# Patient Record
Sex: FEMALE | Race: WHITE | Employment: FULL TIME | URBAN - METROPOLITAN AREA
[De-identification: names, ages, dates, MRNs, and addresses within clinical notes are randomized per-mention and may not be internally consistent; named-entity substitution may affect disease eponyms.]

---

## 2019-06-18 ENCOUNTER — OFFICE VISIT (OUTPATIENT)
Dept: FAMILY MEDICINE | Facility: CLINIC | Age: 29
End: 2019-06-18
Payer: COMMERCIAL

## 2019-06-18 VITALS
WEIGHT: 150 LBS | RESPIRATION RATE: 14 BRPM | TEMPERATURE: 98.4 F | HEART RATE: 66 BPM | DIASTOLIC BLOOD PRESSURE: 70 MMHG | SYSTOLIC BLOOD PRESSURE: 110 MMHG | OXYGEN SATURATION: 99 %

## 2019-06-18 DIAGNOSIS — Z23 NEED FOR HEPATITIS A VACCINATION: ICD-10-CM

## 2019-06-18 DIAGNOSIS — Z71.84 ENCOUNTER FOR COUNSELING FOR TRAVEL: Primary | ICD-10-CM

## 2019-06-18 DIAGNOSIS — Z23 NEED FOR IMMUNIZATION AGAINST TYPHOID: ICD-10-CM

## 2019-06-18 PROCEDURE — 90472 IMMUNIZATION ADMIN EACH ADD: CPT | Performed by: PHYSICIAN ASSISTANT

## 2019-06-18 PROCEDURE — 99401 PREV MED CNSL INDIV APPRX 15: CPT | Mod: 25 | Performed by: PHYSICIAN ASSISTANT

## 2019-06-18 PROCEDURE — 90471 IMMUNIZATION ADMIN: CPT | Performed by: PHYSICIAN ASSISTANT

## 2019-06-18 PROCEDURE — 90632 HEPA VACCINE ADULT IM: CPT | Performed by: PHYSICIAN ASSISTANT

## 2019-06-18 PROCEDURE — 90691 TYPHOID VACCINE IM: CPT | Performed by: PHYSICIAN ASSISTANT

## 2019-06-18 RX ORDER — ISOTRETINOIN 40 MG/1
40 CAPSULE ORAL
Status: ON HOLD | COMMUNITY
Start: 2019-06-11 | End: 2021-04-17

## 2019-06-18 RX ORDER — ATOVAQUONE AND PROGUANIL HYDROCHLORIDE 250; 100 MG/1; MG/1
1 TABLET, FILM COATED ORAL DAILY
Qty: 26 TABLET | Refills: 0 | Status: ON HOLD | OUTPATIENT
Start: 2019-06-18 | End: 2021-04-17

## 2019-06-18 RX ORDER — ETONOGESTREL AND ETHINYL ESTRADIOL VAGINAL RING .015; .12 MG/D; MG/D
1 RING VAGINAL
Status: ON HOLD | COMMUNITY
Start: 2019-01-25 | End: 2021-04-17

## 2019-06-18 RX ORDER — AZITHROMYCIN 500 MG/1
500 TABLET, FILM COATED ORAL DAILY
Qty: 3 TABLET | Refills: 0 | Status: SHIPPED | OUTPATIENT
Start: 2019-06-18 | End: 2019-06-21

## 2019-06-18 NOTE — PROGRESS NOTES
SUBJECTIVE: Tiffanie Leyva , a 29 year old  female, presents for counseling and information regarding upcoming travel to [facility]. Special medical concerns include: none. She anticipates the following unusual exposures: none.    Itinerary:  pleasure    Departure Date: 6/30/2019 Return date: 7/16/2019    Reason for travel (i.e. Business, pleasure): pleaseure    Visiting an urban or rural area?: urban    Accommodations (i.e. hotel, hostel, friends, family, etc): hotel and family    Women - First day of your last period: 6/5/2019    IMMUNIZATION HISTORY  Have you received any vaccinations in the past 4 weeks?  No  Have you ever fainted from having your blood drawn or from an injection?  Yes  Have you ever had a fever reaction to vaccination?  No  Have you ever had any bad reaction or side effect from any vaccination?  No  Have you ever had hepatitis A or B vaccine?  Yes  Do you live (or work closely) with anyone who has AIDS, an AIDS-like condition, any other immune disorder or who is on chemotherapy for cancer?  yes  Have you received any injection of immune globulin or any blood products during the past 12 months?  No    GENERAL MEDICAL HISTORY  Do you have a medical condition that warrants maintenance medication or physician follow-up?  yes  Do you have a medical condition that is stable now, but that may recur while traveling?  No  Has your spleen been removed?  No  Have you had an acute illness or a fever in the past 48 hours?  No  Are you pregnant, or might you become pregnant on this trip?  Any chance of pregnancy?  No  Are you breastfeeding?  No  Do you have HIV, AIDS, an AIDS-like condition, any other immune disorder, leukemia or cancer?  No  Do you have a severe combined immunodeficiency disease?  No  Have you had your thymus gland removed or history of problems with your thymus, such as myasthenia gravis, DiGeorge syndrome, or thymoma?  No    Do you have severe thrombocytopenia (low platelet count) or a  coagulation disorder?  No  Have you ever had a convulsion, seizure, epilepsy, neurologic condition or brain infection?  No  Do you have any stomach conditions?  No  Do you have a G6PD deficiency?  No  Do you have severe renal or kidney impairment?  No  Do you have a history of psychiatric problems?  yes  Do you have a problem with strange dreams and/or nightmares?  No  Do you have insomnia?  No  Do you have problems with vaginitis?  No  Do you have psoriasis?  No  Are you prone to motion sickness?  yes  Have you ever had headaches, nausea, vomiting, or breathing problems from altitude exposure?  No      No past medical history on file.     There is no immunization history on file for this patient.    No current outpatient medications on file.     Allergies not on file     EXAM: deferred    Immunizations discussed include: Hepatitis A and Typhoid  Malaria prophylaxis recommended: Malarone  Symptomatic treatment for traveler's diarrhea: bismuth subsalicylate, loperamide/diphenoxylate and azithromycin    ASSESSMENT/PLAN:    (Z71.89) Encounter for counseling for travel  (primary encounter diagnosis)    Comment: Hepatitis A and typhoid vaccines today. Patient will return or follow-up with PCP in 6 months for Hep A booster. Prophylaxis given for Traveler's diarrhea and Malaria. All questions were answered.     Plan: atovaquone-proguanil (MALARONE) 250-100 MG         tablet, azithromycin (ZITHROMAX) 500 MG tablet            (Z23) Need for hepatitis A vaccination  Comment:   Plan: HEPA VACCINE ADULT IM            (Z23) Need for immunization against typhoid  Comment:   Plan: TYPHOID VACCINE, IM              I have reviewed general recommendations for safe travel   including: food/water precautions, insect avoidance, safe sex   practices given high prevalence of HIV and other STDs,   roadway safety. Educational materials and links to the CDC   Traveler's health website have been provided.    Total time 15 minutes, greater  than 50 percent in counseling   and coordination of care.

## 2019-06-18 NOTE — PATIENT INSTRUCTIONS
See travel packet provided  Recommend ultrathon, pepto bismol and imodium  Also bring hand  and sun screen with you.  Safe Travels       Today June 18, 2019 you received the    Hepatitis A Vaccine - Please return on 12/18/19 or later for your 2nd and final dose.    Typhoid - injectable. This vaccine is valid for two years.   .    These appointments can be made as a NURSE ONLY visit.    **It is very important for the vaccinations to be given on the scheduled day(s), this helps ensure you receive the full effectiveness of the vaccine.**    Please call [facility] with any questions [phone]    Thank you for visiting [facility]

## 2020-09-01 LAB
HBV SURFACE AG SERPL QL IA: NEGATIVE
HIV 1+2 AB+HIV1 P24 AG SERPL QL IA: NEGATIVE
RUBELLA ABY IGG: NORMAL

## 2021-03-16 LAB — GROUP B STREP PCR: NEGATIVE

## 2021-04-07 DIAGNOSIS — Z33.1 PREGNANCY, INCIDENTAL: Primary | ICD-10-CM

## 2021-04-15 ENCOUNTER — ANESTHESIA (OUTPATIENT)
Dept: OBGYN | Facility: CLINIC | Age: 31
End: 2021-04-15
Payer: COMMERCIAL

## 2021-04-15 ENCOUNTER — ANESTHESIA EVENT (OUTPATIENT)
Dept: OBGYN | Facility: CLINIC | Age: 31
End: 2021-04-15
Payer: COMMERCIAL

## 2021-04-15 ENCOUNTER — HOSPITAL ENCOUNTER (INPATIENT)
Facility: CLINIC | Age: 31
LOS: 3 days | Discharge: HOME | End: 2021-04-18
Attending: OBSTETRICS & GYNECOLOGY | Admitting: OBSTETRICS & GYNECOLOGY
Payer: COMMERCIAL

## 2021-04-15 DIAGNOSIS — O14.00 MILD PRE-ECLAMPSIA, ANTEPARTUM: ICD-10-CM

## 2021-04-15 DIAGNOSIS — Z33.1 PREGNANCY, INCIDENTAL: ICD-10-CM

## 2021-04-15 PROBLEM — Z36.89 ENCOUNTER FOR TRIAGE IN PREGNANT PATIENT: Status: ACTIVE | Noted: 2021-04-15

## 2021-04-15 LAB
ABO + RH BLD: NORMAL
ABO + RH BLD: NORMAL
ALBUMIN SERPL-MCNC: 2.6 G/DL (ref 3.4–5)
ALP SERPL-CCNC: 136 U/L (ref 40–150)
ALT SERPL W P-5'-P-CCNC: 17 U/L (ref 0–50)
ANION GAP SERPL CALCULATED.3IONS-SCNC: 7 MMOL/L (ref 3–14)
AST SERPL W P-5'-P-CCNC: 13 U/L (ref 0–45)
BILIRUB SERPL-MCNC: 0.3 MG/DL (ref 0.2–1.3)
BUN SERPL-MCNC: 8 MG/DL (ref 7–30)
CALCIUM SERPL-MCNC: 8.6 MG/DL (ref 8.5–10.1)
CHLORIDE SERPL-SCNC: 109 MMOL/L (ref 94–109)
CO2 SERPL-SCNC: 21 MMOL/L (ref 20–32)
CREAT SERPL-MCNC: 0.63 MG/DL (ref 0.52–1.04)
CREAT UR-MCNC: 27 MG/DL
ERYTHROCYTE [DISTWIDTH] IN BLOOD BY AUTOMATED COUNT: 12.7 % (ref 10–15)
GFR SERPL CREATININE-BSD FRML MDRD: >90 ML/MIN/{1.73_M2}
GLUCOSE SERPL-MCNC: 72 MG/DL (ref 70–99)
HCT VFR BLD AUTO: 39.6 % (ref 35–47)
HGB BLD-MCNC: 12.8 G/DL (ref 11.7–15.7)
LABORATORY COMMENT REPORT: NORMAL
MCH RBC QN AUTO: 29.8 PG (ref 26.5–33)
MCHC RBC AUTO-ENTMCNC: 32.3 G/DL (ref 31.5–36.5)
MCV RBC AUTO: 92 FL (ref 78–100)
PLATELET # BLD AUTO: 248 10E9/L (ref 150–450)
POTASSIUM SERPL-SCNC: 3.9 MMOL/L (ref 3.4–5.3)
PROT SERPL-MCNC: 6.3 G/DL (ref 6.8–8.8)
PROT UR-MCNC: 0.1 G/L
PROT/CREAT 24H UR: 0.38 G/G CR (ref 0–0.2)
RBC # BLD AUTO: 4.29 10E12/L (ref 3.8–5.2)
SARS-COV-2 RNA RESP QL NAA+PROBE: NEGATIVE
SARS-COV-2 RNA RESP QL NAA+PROBE: NORMAL
SODIUM SERPL-SCNC: 137 MMOL/L (ref 133–144)
SPECIMEN EXP DATE BLD: NORMAL
SPECIMEN SOURCE: NORMAL
SPECIMEN SOURCE: NORMAL
WBC # BLD AUTO: 9.3 10E9/L (ref 4–11)

## 2021-04-15 PROCEDURE — 84156 ASSAY OF PROTEIN URINE: CPT | Performed by: OBSTETRICS & GYNECOLOGY

## 2021-04-15 PROCEDURE — 120N000001 HC R&B MED SURG/OB

## 2021-04-15 PROCEDURE — U0003 INFECTIOUS AGENT DETECTION BY NUCLEIC ACID (DNA OR RNA); SEVERE ACUTE RESPIRATORY SYNDROME CORONAVIRUS 2 (SARS-COV-2) (CORONAVIRUS DISEASE [COVID-19]), AMPLIFIED PROBE TECHNIQUE, MAKING USE OF HIGH THROUGHPUT TECHNOLOGIES AS DESCRIBED BY CMS-2020-01-R: HCPCS | Performed by: OBSTETRICS & GYNECOLOGY

## 2021-04-15 PROCEDURE — 86900 BLOOD TYPING SEROLOGIC ABO: CPT | Performed by: OBSTETRICS & GYNECOLOGY

## 2021-04-15 PROCEDURE — 80053 COMPREHEN METABOLIC PANEL: CPT | Performed by: OBSTETRICS & GYNECOLOGY

## 2021-04-15 PROCEDURE — 370N000003 HC ANESTHESIA WARD SERVICE

## 2021-04-15 PROCEDURE — U0005 INFEC AGEN DETEC AMPLI PROBE: HCPCS | Performed by: OBSTETRICS & GYNECOLOGY

## 2021-04-15 PROCEDURE — 86780 TREPONEMA PALLIDUM: CPT | Performed by: OBSTETRICS & GYNECOLOGY

## 2021-04-15 PROCEDURE — 85027 COMPLETE CBC AUTOMATED: CPT | Performed by: OBSTETRICS & GYNECOLOGY

## 2021-04-15 PROCEDURE — 999N000011 HC STATISTIC ANESTHESIA CASE

## 2021-04-15 PROCEDURE — 36415 COLL VENOUS BLD VENIPUNCTURE: CPT | Performed by: OBSTETRICS & GYNECOLOGY

## 2021-04-15 PROCEDURE — 86901 BLOOD TYPING SEROLOGIC RH(D): CPT | Performed by: OBSTETRICS & GYNECOLOGY

## 2021-04-15 RX ORDER — NALOXONE HYDROCHLORIDE 0.4 MG/ML
0.4 INJECTION, SOLUTION INTRAMUSCULAR; INTRAVENOUS; SUBCUTANEOUS
Status: DISCONTINUED | OUTPATIENT
Start: 2021-04-15 | End: 2021-04-16

## 2021-04-15 RX ORDER — ONDANSETRON 2 MG/ML
4 INJECTION INTRAMUSCULAR; INTRAVENOUS EVERY 6 HOURS PRN
Status: DISCONTINUED | OUTPATIENT
Start: 2021-04-15 | End: 2021-04-16

## 2021-04-15 RX ORDER — NALBUPHINE HYDROCHLORIDE 10 MG/ML
2.5-5 INJECTION, SOLUTION INTRAMUSCULAR; INTRAVENOUS; SUBCUTANEOUS EVERY 6 HOURS PRN
Status: DISCONTINUED | OUTPATIENT
Start: 2021-04-15 | End: 2021-04-16

## 2021-04-15 RX ORDER — MAGNESIUM SULFATE HEPTAHYDRATE 40 MG/ML
2 INJECTION, SOLUTION INTRAVENOUS
Status: DISCONTINUED | OUTPATIENT
Start: 2021-04-15 | End: 2021-04-16

## 2021-04-15 RX ORDER — DIPHENHYDRAMINE HYDROCHLORIDE 50 MG/ML
25 INJECTION INTRAMUSCULAR; INTRAVENOUS EVERY 6 HOURS PRN
Status: DISCONTINUED | OUTPATIENT
Start: 2021-04-15 | End: 2021-04-18 | Stop reason: HOSPADM

## 2021-04-15 RX ORDER — NALOXONE HYDROCHLORIDE 0.4 MG/ML
0.2 INJECTION, SOLUTION INTRAMUSCULAR; INTRAVENOUS; SUBCUTANEOUS
Status: DISCONTINUED | OUTPATIENT
Start: 2021-04-15 | End: 2021-04-16

## 2021-04-15 RX ORDER — ACETAMINOPHEN 325 MG/1
650 TABLET ORAL EVERY 4 HOURS PRN
Status: DISCONTINUED | OUTPATIENT
Start: 2021-04-15 | End: 2021-04-16

## 2021-04-15 RX ORDER — ESCITALOPRAM OXALATE 20 MG/1
20 TABLET ORAL DAILY
COMMUNITY
Start: 2021-04-08

## 2021-04-15 RX ORDER — LABETALOL HYDROCHLORIDE 5 MG/ML
80 INJECTION, SOLUTION INTRAVENOUS EVERY 10 MIN PRN
Status: DISCONTINUED | OUTPATIENT
Start: 2021-04-15 | End: 2021-04-16

## 2021-04-15 RX ORDER — MAGNESIUM SULFATE HEPTAHYDRATE 500 MG/ML
4 INJECTION, SOLUTION INTRAMUSCULAR; INTRAVENOUS
Status: DISCONTINUED | OUTPATIENT
Start: 2021-04-15 | End: 2021-04-16

## 2021-04-15 RX ORDER — MAGNESIUM SULFATE HEPTAHYDRATE 40 MG/ML
4 INJECTION, SOLUTION INTRAVENOUS
Status: DISCONTINUED | OUTPATIENT
Start: 2021-04-15 | End: 2021-04-16

## 2021-04-15 RX ORDER — NIFEDIPINE 10 MG/1
10 CAPSULE ORAL
Status: DISCONTINUED | OUTPATIENT
Start: 2021-04-15 | End: 2021-04-16

## 2021-04-15 RX ORDER — LABETALOL HYDROCHLORIDE 5 MG/ML
40 INJECTION, SOLUTION INTRAVENOUS EVERY 10 MIN PRN
Status: DISCONTINUED | OUTPATIENT
Start: 2021-04-15 | End: 2021-04-16

## 2021-04-15 RX ORDER — OXYTOCIN 10 [USP'U]/ML
10 INJECTION, SOLUTION INTRAMUSCULAR; INTRAVENOUS
Status: DISCONTINUED | OUTPATIENT
Start: 2021-04-15 | End: 2021-04-16

## 2021-04-15 RX ORDER — ESCITALOPRAM OXALATE 20 MG/1
20 TABLET ORAL DAILY
Status: DISCONTINUED | OUTPATIENT
Start: 2021-04-16 | End: 2021-04-18 | Stop reason: HOSPADM

## 2021-04-15 RX ORDER — LABETALOL HYDROCHLORIDE 5 MG/ML
20 INJECTION, SOLUTION INTRAVENOUS EVERY 10 MIN PRN
Status: DISCONTINUED | OUTPATIENT
Start: 2021-04-15 | End: 2021-04-16

## 2021-04-15 RX ORDER — OXYTOCIN/0.9 % SODIUM CHLORIDE 30/500 ML
100-340 PLASTIC BAG, INJECTION (ML) INTRAVENOUS CONTINUOUS PRN
Status: DISCONTINUED | OUTPATIENT
Start: 2021-04-15 | End: 2021-04-16

## 2021-04-15 RX ORDER — ONDANSETRON 4 MG/1
4 TABLET, ORALLY DISINTEGRATING ORAL EVERY 6 HOURS PRN
Status: DISCONTINUED | OUTPATIENT
Start: 2021-04-15 | End: 2021-04-16

## 2021-04-15 RX ORDER — FENTANYL CITRATE-0.9 % NACL/PF 10 MCG/ML
100 PLASTIC BAG, INJECTION (ML) INTRAVENOUS EVERY 5 MIN PRN
Status: DISCONTINUED | OUTPATIENT
Start: 2021-04-15 | End: 2021-04-16

## 2021-04-15 RX ORDER — SODIUM CHLORIDE, SODIUM LACTATE, POTASSIUM CHLORIDE, CALCIUM CHLORIDE 600; 310; 30; 20 MG/100ML; MG/100ML; MG/100ML; MG/100ML
10-125 INJECTION, SOLUTION INTRAVENOUS CONTINUOUS
Status: DISCONTINUED | OUTPATIENT
Start: 2021-04-15 | End: 2021-04-16

## 2021-04-15 RX ORDER — METHYLERGONOVINE MALEATE 0.2 MG/ML
200 INJECTION INTRAVENOUS
Status: DISCONTINUED | OUTPATIENT
Start: 2021-04-15 | End: 2021-04-16

## 2021-04-15 RX ORDER — TRANEXAMIC ACID 10 MG/ML
1 INJECTION, SOLUTION INTRAVENOUS EVERY 30 MIN PRN
Status: DISCONTINUED | OUTPATIENT
Start: 2021-04-15 | End: 2021-04-16

## 2021-04-15 RX ORDER — DIPHENHYDRAMINE HCL 25 MG
25 CAPSULE ORAL EVERY 6 HOURS PRN
Status: DISCONTINUED | OUTPATIENT
Start: 2021-04-15 | End: 2021-04-18 | Stop reason: HOSPADM

## 2021-04-15 RX ORDER — CARBOPROST TROMETHAMINE 250 UG/ML
250 INJECTION, SOLUTION INTRAMUSCULAR
Status: DISCONTINUED | OUTPATIENT
Start: 2021-04-15 | End: 2021-04-16

## 2021-04-15 RX ORDER — HYDROXYZINE HYDROCHLORIDE 50 MG/1
50 TABLET, FILM COATED ORAL EVERY 6 HOURS
COMMUNITY
Start: 2020-09-29

## 2021-04-15 RX ORDER — SODIUM CHLORIDE, SODIUM LACTATE, POTASSIUM CHLORIDE, CALCIUM CHLORIDE 600; 310; 30; 20 MG/100ML; MG/100ML; MG/100ML; MG/100ML
INJECTION, SOLUTION INTRAVENOUS CONTINUOUS
Status: DISCONTINUED | OUTPATIENT
Start: 2021-04-15 | End: 2021-04-16

## 2021-04-15 RX ORDER — IBUPROFEN 800 MG/1
800 TABLET, FILM COATED ORAL
Status: DISCONTINUED | OUTPATIENT
Start: 2021-04-15 | End: 2021-04-16

## 2021-04-15 RX ORDER — LORAZEPAM 2 MG/ML
2 INJECTION INTRAMUSCULAR
Status: DISCONTINUED | OUTPATIENT
Start: 2021-04-15 | End: 2021-04-16

## 2021-04-15 RX ORDER — LIDOCAINE HYDROCHLORIDE AND EPINEPHRINE 15; 5 MG/ML; UG/ML
3 INJECTION, SOLUTION EPIDURAL
Status: DISCONTINUED | OUTPATIENT
Start: 2021-04-15 | End: 2021-04-16

## 2021-04-15 RX ORDER — OXYCODONE AND ACETAMINOPHEN 5; 325 MG/1; MG/1
1 TABLET ORAL
Status: DISCONTINUED | OUTPATIENT
Start: 2021-04-15 | End: 2021-04-16

## 2021-04-15 RX ORDER — HYDROXYZINE HYDROCHLORIDE 50 MG/ML
25 INJECTION, SOLUTION INTRAMUSCULAR EVERY 6 HOURS PRN
Status: DISCONTINUED | OUTPATIENT
Start: 2021-04-15 | End: 2021-04-18 | Stop reason: HOSPADM

## 2021-04-15 ASSESSMENT — MIFFLIN-ST. JEOR: SCORE: 1687.5

## 2021-04-15 NOTE — PROVIDER NOTIFICATION
04/15/21 1730   Provider Notification   Provider Name/Title Dr Park   Method of Notification At Bedside   Request Evaluate in Person   Notification Reason Status Update;SVE

## 2021-04-15 NOTE — H&P
Labor & Delivery History & Physical  Patient Name:  Tiffanie Leyva   MRN:  [id number]  Age:  30 year old    YOB: 1990      Subjective:    Tiffanie Leyva is a 30 year old  at 40+6 wga, who presents from clinic for pre-E evaluation.  She had mild range Bps in clinic.  She denies any headaches, vision change, CP, SOB, n/v, upper abdominal pain, or increased swelling.    Patient denies leaking of fluid or vaginal bleeding.  Fetal Movement: present.       Prenatal care complicated by:  - Anx, Dep: on Lexapro 20mg and Atarax PRN.  Followed by psych and therapy  - A pos  - GBS negative       Pregnancy Episode Problems (from 22 to present)     No problems associated with this episode.        OB History    Para Term  AB Living   1 0 0 0 0 0   SAB TAB Ectopic Multiple Live Births   0 0 0 0 0      # Outcome Date GA Lbr Dhaval/2nd Weight Sex Delivery Anes PTL Lv   1 Current              No past medical history on file.  No past surgical history on file.  Social History     Tobacco Use     Smoking status: Never Smoker     Smokeless tobacco: Never Used   Substance Use Topics     Alcohol use: Yes     Drug use: Never      History   Drug Use Unknown     No family history on file.  [unfilled]   Medications Prior to Admission   Medication Sig Dispense Refill Last Dose     atovaquone-proguanil (MALARONE) 250-100 MG tablet Take 1 tablet by mouth daily Start 2 days before travel and continue 7 days after return. 26 tablet 0      etonogestrel-ethinyl estradiol (NUVARING) 0.12-0.015 MG/24HR vaginal ring Place 1 each vaginally every 28 days        ISOtretinoin (ACCUTANE) 40 MG capsule Take 40 mg by mouth        Most Recent Immunizations   Administered Date(s) Administered     Hep B, Peds or Adolescent 2004     HepA-Adult 2019     HepB, Unspecified 2003     Hib (PRP-T) 1992     Historical DTP/aP 1996     Influenza Vaccine IM > 6 months Valent IIV4 2019      Influenza Vaccine IM Ages 6-35 Months 4 Valent (PF) 2005     MMR 2003     OPV, unspecified 1996     TDAP Vaccine (Adacel) 06/10/2015     Td (Adult), Adsorbed 2003     Typhoid IM 2019       Review of Systems - NEGATIVE FOR  Fevers  Chills  Headache  Visual changes  Ear pain  Sore throat  Cough  Shortness of breath  Chest pain  Palpitations  Constipation  Diarrhea  Vomiting  Bloody stools  Hematuria  Dysuria  Muscle weakness  Gait disturbance    Objective:  BP: ()/()   Arterial Line BP: ()/()   0 lbs 0 oz      General: General appearance: NAD.   Lungs:   unlabored   Heart:     Abdomen: Gravid, nontender   Branch: Contraction Frequency (min): q 3-5 min  Contraction Duration (sec):     FHT: Baseline 120 BPM   Fetal heart variability:moderate  Fetal heart rate accelerations:  Present 15 x 15  Fetal heart rate decelerations: none  Fetal heart rate interpretation:  Category I   Speculum:     Cervix: Dilation:   1.5  Effacement:     50  Station:             -1    Extremities: Trace to 1+ edema bilateral, symmetric edema; neg Clyde's sign       Lab Review:    No results found for: HGB, HCT, RPR, HEPBSAG, TSH        Assessment  Tiffanie Leyva is a 30 year old  at 40+6 wga with preE without SF    Plan  - Pre-E without SF:  Diagnosis based on mild range Bps and elevated prot:cr ratio (0.38).     - Will admit and start cervical ripening.     - Monitor Bps closely and treat with IV antihypertensives + mag if sustained severe range Bps  - Cervical ripening: We discussed her options for cytotec, cervidil, and balloon.  We reviewed risks of cytotec and cervidil include tachysystole (given that she is already roxanne regularly) with possible subsequent fetal distress.  She will think about her options.  - FHT: cat I. Cont CEFM.  - Anx, Dep: on Lexapro 20mg and Atarax PRN.  Mood stable with no SI/HI. Followed by psych and therapy .  - A pos  - GBS negative  - PP Contraception: NuvaRing  -  Anticipate

## 2021-04-15 NOTE — PROVIDER NOTIFICATION
04/15/21 1840   Provider Notification   Provider Name/Title Dr Park   Method of Notification Electronic Page;Phone   Notification Reason Other (Comment)   pt preference for cerv ripening - balloon

## 2021-04-16 LAB — T PALLIDUM AB SER QL: NONREACTIVE

## 2021-04-16 PROCEDURE — 250N000013 HC RX MED GY IP 250 OP 250 PS 637: Performed by: OBSTETRICS & GYNECOLOGY

## 2021-04-16 PROCEDURE — 722N000001 HC LABOR CARE VAGINAL DELIVERY SINGLE

## 2021-04-16 PROCEDURE — 3E0R3BZ INTRODUCTION OF ANESTHETIC AGENT INTO SPINAL CANAL, PERCUTANEOUS APPROACH: ICD-10-PCS | Performed by: ANESTHESIOLOGY

## 2021-04-16 PROCEDURE — 88307 TISSUE EXAM BY PATHOLOGIST: CPT | Mod: 26 | Performed by: PATHOLOGY

## 2021-04-16 PROCEDURE — 250N000011 HC RX IP 250 OP 636: Performed by: ANESTHESIOLOGY

## 2021-04-16 PROCEDURE — 0KQM0ZZ REPAIR PERINEUM MUSCLE, OPEN APPROACH: ICD-10-PCS | Performed by: OBSTETRICS & GYNECOLOGY

## 2021-04-16 PROCEDURE — 88307 TISSUE EXAM BY PATHOLOGIST: CPT | Mod: TC | Performed by: OBSTETRICS & GYNECOLOGY

## 2021-04-16 PROCEDURE — 120N000001 HC R&B MED SURG/OB

## 2021-04-16 PROCEDURE — 250N000009 HC RX 250: Performed by: OBSTETRICS & GYNECOLOGY

## 2021-04-16 PROCEDURE — 258N000003 HC RX IP 258 OP 636: Performed by: OBSTETRICS & GYNECOLOGY

## 2021-04-16 PROCEDURE — 10907ZC DRAINAGE OF AMNIOTIC FLUID, THERAPEUTIC FROM PRODUCTS OF CONCEPTION, VIA NATURAL OR ARTIFICIAL OPENING: ICD-10-PCS | Performed by: OBSTETRICS & GYNECOLOGY

## 2021-04-16 PROCEDURE — 00HU33Z INSERTION OF INFUSION DEVICE INTO SPINAL CANAL, PERCUTANEOUS APPROACH: ICD-10-PCS | Performed by: ANESTHESIOLOGY

## 2021-04-16 RX ORDER — MAGNESIUM SULFATE HEPTAHYDRATE 40 MG/ML
2 INJECTION, SOLUTION INTRAVENOUS
Status: DISCONTINUED | OUTPATIENT
Start: 2021-04-16 | End: 2021-04-18 | Stop reason: HOSPADM

## 2021-04-16 RX ORDER — LORAZEPAM 2 MG/ML
2 INJECTION INTRAMUSCULAR
Status: DISCONTINUED | OUTPATIENT
Start: 2021-04-16 | End: 2021-04-18 | Stop reason: HOSPADM

## 2021-04-16 RX ORDER — MAGNESIUM SULFATE HEPTAHYDRATE 40 MG/ML
4 INJECTION, SOLUTION INTRAVENOUS
Status: DISCONTINUED | OUTPATIENT
Start: 2021-04-16 | End: 2021-04-18 | Stop reason: HOSPADM

## 2021-04-16 RX ORDER — IBUPROFEN 800 MG/1
800 TABLET, FILM COATED ORAL EVERY 6 HOURS PRN
Status: DISCONTINUED | OUTPATIENT
Start: 2021-04-16 | End: 2021-04-18 | Stop reason: HOSPADM

## 2021-04-16 RX ORDER — TRANEXAMIC ACID 10 MG/ML
1 INJECTION, SOLUTION INTRAVENOUS EVERY 30 MIN PRN
Status: DISCONTINUED | OUTPATIENT
Start: 2021-04-16 | End: 2021-04-18 | Stop reason: HOSPADM

## 2021-04-16 RX ORDER — NALBUPHINE HYDROCHLORIDE 10 MG/ML
2.5-5 INJECTION, SOLUTION INTRAMUSCULAR; INTRAVENOUS; SUBCUTANEOUS EVERY 6 HOURS PRN
Status: DISCONTINUED | OUTPATIENT
Start: 2021-04-16 | End: 2021-04-16

## 2021-04-16 RX ORDER — AMOXICILLIN 250 MG
2 CAPSULE ORAL 2 TIMES DAILY
Status: DISCONTINUED | OUTPATIENT
Start: 2021-04-16 | End: 2021-04-18 | Stop reason: HOSPADM

## 2021-04-16 RX ORDER — OXYTOCIN/0.9 % SODIUM CHLORIDE 30/500 ML
100 PLASTIC BAG, INJECTION (ML) INTRAVENOUS CONTINUOUS
Status: DISCONTINUED | OUTPATIENT
Start: 2021-04-16 | End: 2021-04-18 | Stop reason: HOSPADM

## 2021-04-16 RX ORDER — FENTANYL CITRATE-0.9 % NACL/PF 10 MCG/ML
100 PLASTIC BAG, INJECTION (ML) INTRAVENOUS EVERY 5 MIN PRN
Status: DISCONTINUED | OUTPATIENT
Start: 2021-04-16 | End: 2021-04-16

## 2021-04-16 RX ORDER — LABETALOL HYDROCHLORIDE 5 MG/ML
80 INJECTION, SOLUTION INTRAVENOUS EVERY 10 MIN PRN
Status: DISCONTINUED | OUTPATIENT
Start: 2021-04-16 | End: 2021-04-18 | Stop reason: HOSPADM

## 2021-04-16 RX ORDER — AMOXICILLIN 250 MG
1 CAPSULE ORAL 2 TIMES DAILY
Status: DISCONTINUED | OUTPATIENT
Start: 2021-04-16 | End: 2021-04-18 | Stop reason: HOSPADM

## 2021-04-16 RX ORDER — OXYTOCIN 10 [USP'U]/ML
10 INJECTION, SOLUTION INTRAMUSCULAR; INTRAVENOUS
Status: DISCONTINUED | OUTPATIENT
Start: 2021-04-16 | End: 2021-04-18 | Stop reason: HOSPADM

## 2021-04-16 RX ORDER — SCOLOPAMINE TRANSDERMAL SYSTEM 1 MG/1
1 PATCH, EXTENDED RELEASE TRANSDERMAL
Status: DISCONTINUED | OUTPATIENT
Start: 2021-04-16 | End: 2021-04-16

## 2021-04-16 RX ORDER — CALCIUM GLUCONATE 94 MG/ML
1 INJECTION, SOLUTION INTRAVENOUS
Status: DISCONTINUED | OUTPATIENT
Start: 2021-04-16 | End: 2021-04-18 | Stop reason: HOSPADM

## 2021-04-16 RX ORDER — LABETALOL HYDROCHLORIDE 5 MG/ML
20 INJECTION, SOLUTION INTRAVENOUS EVERY 10 MIN PRN
Status: DISCONTINUED | OUTPATIENT
Start: 2021-04-16 | End: 2021-04-18 | Stop reason: HOSPADM

## 2021-04-16 RX ORDER — LABETALOL HYDROCHLORIDE 5 MG/ML
40 INJECTION, SOLUTION INTRAVENOUS EVERY 10 MIN PRN
Status: DISCONTINUED | OUTPATIENT
Start: 2021-04-16 | End: 2021-04-18 | Stop reason: HOSPADM

## 2021-04-16 RX ORDER — ACETAMINOPHEN 325 MG/1
650 TABLET ORAL EVERY 4 HOURS PRN
Status: DISCONTINUED | OUTPATIENT
Start: 2021-04-16 | End: 2021-04-18 | Stop reason: HOSPADM

## 2021-04-16 RX ORDER — BISACODYL 10 MG
10 SUPPOSITORY, RECTAL RECTAL DAILY PRN
Status: DISCONTINUED | OUTPATIENT
Start: 2021-04-18 | End: 2021-04-18 | Stop reason: HOSPADM

## 2021-04-16 RX ORDER — MODIFIED LANOLIN
OINTMENT (GRAM) TOPICAL
Status: DISCONTINUED | OUTPATIENT
Start: 2021-04-16 | End: 2021-04-18 | Stop reason: HOSPADM

## 2021-04-16 RX ORDER — NIFEDIPINE 10 MG/1
10 CAPSULE ORAL
Status: DISCONTINUED | OUTPATIENT
Start: 2021-04-16 | End: 2021-04-18 | Stop reason: HOSPADM

## 2021-04-16 RX ORDER — OXYTOCIN/0.9 % SODIUM CHLORIDE 30/500 ML
340 PLASTIC BAG, INJECTION (ML) INTRAVENOUS CONTINUOUS PRN
Status: DISCONTINUED | OUTPATIENT
Start: 2021-04-16 | End: 2021-04-18 | Stop reason: HOSPADM

## 2021-04-16 RX ORDER — MAGNESIUM SULFATE HEPTAHYDRATE 500 MG/ML
4 INJECTION, SOLUTION INTRAMUSCULAR; INTRAVENOUS
Status: DISCONTINUED | OUTPATIENT
Start: 2021-04-16 | End: 2021-04-18 | Stop reason: HOSPADM

## 2021-04-16 RX ORDER — HYDROCORTISONE 2.5 %
CREAM (GRAM) TOPICAL 3 TIMES DAILY PRN
Status: DISCONTINUED | OUTPATIENT
Start: 2021-04-16 | End: 2021-04-18 | Stop reason: HOSPADM

## 2021-04-16 RX ORDER — OXYTOCIN/0.9 % SODIUM CHLORIDE 30/500 ML
1-24 PLASTIC BAG, INJECTION (ML) INTRAVENOUS CONTINUOUS
Status: DISCONTINUED | OUTPATIENT
Start: 2021-04-16 | End: 2021-04-16

## 2021-04-16 RX ORDER — FENTANYL CITRATE 50 UG/ML
50-100 INJECTION, SOLUTION INTRAMUSCULAR; INTRAVENOUS
Status: DISCONTINUED | OUTPATIENT
Start: 2021-04-16 | End: 2021-04-16

## 2021-04-16 RX ORDER — SODIUM CHLORIDE, SODIUM LACTATE, POTASSIUM CHLORIDE, CALCIUM CHLORIDE 600; 310; 30; 20 MG/100ML; MG/100ML; MG/100ML; MG/100ML
10-125 INJECTION, SOLUTION INTRAVENOUS CONTINUOUS
Status: DISCONTINUED | OUTPATIENT
Start: 2021-04-16 | End: 2021-04-18 | Stop reason: HOSPADM

## 2021-04-16 RX ADMIN — ESCITALOPRAM OXALATE 20 MG: 20 TABLET ORAL at 18:06

## 2021-04-16 RX ADMIN — Medication 3 MILLI-UNITS/MIN: at 16:01

## 2021-04-16 RX ADMIN — DOCUSATE SODIUM 50 MG AND SENNOSIDES 8.6 MG 1 TABLET: 8.6; 5 TABLET, FILM COATED ORAL at 22:01

## 2021-04-16 RX ADMIN — IBUPROFEN 800 MG: 800 TABLET ORAL at 17:30

## 2021-04-16 RX ADMIN — ACETAMINOPHEN 650 MG: 325 TABLET, FILM COATED ORAL at 06:16

## 2021-04-16 RX ADMIN — IBUPROFEN 800 MG: 800 TABLET ORAL at 23:17

## 2021-04-16 RX ADMIN — Medication 5 ML/HR: at 09:32

## 2021-04-16 RX ADMIN — SODIUM CHLORIDE, POTASSIUM CHLORIDE, SODIUM LACTATE AND CALCIUM CHLORIDE 500 ML: 600; 310; 30; 20 INJECTION, SOLUTION INTRAVENOUS at 08:51

## 2021-04-16 RX ADMIN — Medication 2 MILLI-UNITS/MIN: at 15:19

## 2021-04-16 RX ADMIN — SODIUM CHLORIDE, POTASSIUM CHLORIDE, SODIUM LACTATE AND CALCIUM CHLORIDE 500 ML: 600; 310; 30; 20 INJECTION, SOLUTION INTRAVENOUS at 08:50

## 2021-04-16 RX ADMIN — SODIUM CHLORIDE, POTASSIUM CHLORIDE, SODIUM LACTATE AND CALCIUM CHLORIDE: 600; 310; 30; 20 INJECTION, SOLUTION INTRAVENOUS at 13:27

## 2021-04-16 RX ADMIN — DIPHENHYDRAMINE HYDROCHLORIDE 25 MG: 25 CAPSULE ORAL at 02:11

## 2021-04-16 NOTE — PROVIDER NOTIFICATION
04/16/21 1430   Provider Notification   Provider Name/Title trixie   Method of Notification At Bedside   Notification Reason Other (Comment)  (Pushing)   SVE done by Dr Rodriguez, orders to begin pushing, Dr Rodirguez at bedside for pushing.

## 2021-04-16 NOTE — PROVIDER NOTIFICATION
04/15/21 2300   Provider Notification   Provider Name/Title Dr. Park   Method of Notification In Department   Request Evaluate - Remote   Notification Reason Status Update   Pt notified RN that she is having bleeding through the grewal tube, small amount of bleeding observed. Pt describes intermittent cramping, now feeling like menstrual cramping. Heating pad provided for back discomfort. MD states that if contractions space to every 7-10 minutes may start pitocin at 2 milliunits/min. Dose not to be increased until removal of the Grewal bulb.

## 2021-04-16 NOTE — PROVIDER NOTIFICATION
04/15/21 2031   Provider Notification   Provider Name/Title Dr. Park   Method of Notification In Department   Request Evaluate - Remote   Notification Reason Maternal Vital Sign Change     MD updated. Severe range BP x1, 161/94. Waiting for second check. Pt denies symptoms of pre-eclampsia, no headache or vision changes. Pt continues to contract regularly, denies painful contractions.  Recheck 152/92. Will continue to monitor. MD states q4 hour BP checks appropriate. Will assess grewal balloon at 0000 and before 0700. If contractions space notify MD, may order low dose Pitocin.

## 2021-04-16 NOTE — PLAN OF CARE
Vitally stable. Up to bathroom, unable to void at this time. Tolerating regular diet. Breast feeding infant. Denies pain. FOB at bedside and supportive. Transferred to room 431 with infant in arms in the wheelchair.

## 2021-04-16 NOTE — PROVIDER NOTIFICATION
04/16/21 0734   Provider Notification   Provider Name/Title Dr. Rodriguez   Method of Notification At Bedside   Request Evaluate in Person   Notification Reason Labor Status     MD at bedside, grewal removed (was in the vagina), SVE 9/100/0. Bulging bag of water left intact.

## 2021-04-16 NOTE — PROVIDER NOTIFICATION
04/16/21 1512   Provider Notification   Provider Name/Title Dr Rodriguez   Method of Notification In Department   Notification Reason Status Update   Updated re: SVE done and right lip of cervix still felt.When patient pushes, cervical lip goes away and pt pushing well with good fetal decsent.  states okay to continue with pushing. Also contractions spacing, q 3-4 minutes. Orders for pit augmentation. Pt agrees with plan.

## 2021-04-16 NOTE — PROGRESS NOTES
"[facility]   OB PROGRESS NOTE     S. Pt states she is doing well overall. Grewal balloon removed . +FM    /71   Temp 98  F (36.7  C) (Oral)   Resp 16   Ht 1.753 m (5' 9\")   Wt 89.8 kg (198 lb)   BMI 29.24 kg/m      Fetal Heart Tones: 145 baseline, moderate variablility, + accels, no decels and Category I  TOCO: frequency q 1-3 minutes  Cervical Exam: 9.5/ 100/ Anterior/ soft/ 0     A/P Tiffanie Leyva is a 30 year old  at 41w0d here with     1- pre-eclampsia without SF  - PCR 0.38   - grewal balloon removed and dilated to 9.5cm  - FHT Cat I  - continue monitoring and plan for vaginal delivery    Dr. Susy Rodriguez DO  [phone]      "

## 2021-04-16 NOTE — PROGRESS NOTES
"[facility]   OB PROGRESS NOTE     S. Pt states she is feeling pressure and back pain . +FM    /65   Temp 97.8  F (36.6  C) (Oral)   Resp 16   Ht 1.753 m (5' 9\")   Wt 89.8 kg (198 lb)   SpO2 98%   BMI 29.24 kg/m      Fetal Heart Tones: 120 baseline, moderate variablility, + accels, no decels and Category I  TOCO: frequency q 2-5 minutes  Cervical Exam: 5.5/ 90/ Anterior/ soft/ -1     A/P Tiffanie Leyva is a 30 year old  at 41w0d here with     1- IOL for pre-eclampsia without SF  - AROM with meconium and cervix noted to be 5-6 cm   - patient requesting epidural and will have anesthesia place.  - FHT Cat I  - continue monitoring    Dr. Susy Rodriguez DO  [phone]      "

## 2021-04-16 NOTE — PLAN OF CARE
Pt vitals stable, BP elevated, no severe range BP. Pt denies symptoms of pre-eclampsia. Hernandez balloon remains in place. Small amount of vaginal bleeding/mucous present. Pt was unable to sleep overnight, reporting cramping. She is now breathing through contractions and reporting constant back pain, up on the birthing ball. Luis every 1-4.5 minutes. FHR category 1.

## 2021-04-16 NOTE — PROVIDER NOTIFICATION
04/16/21 1400   Provider Notification   Provider Name/Title Dr. Rodriguez   Method of Notification In Department     Updated that pt had small bowel movement. SVE 9.5 very thin lip/100/+1. Provider will recheck at 1430.

## 2021-04-16 NOTE — ANESTHESIA PROCEDURE NOTES
Epidural catheter Procedure Note  Pre-Procedure   Staff -        Anesthesiologist:  Kian Rao DO       Performed By: anesthesiologist       Referred By: Susy Rodriguez DO       Location: OB       Pre-Anesthestic Checklist: patient identified, IV checked, risks and benefits discussed, informed consent, monitors and equipment checked, pre-op evaluation and at physician/surgeon's request  Timeout:       Correct Patient: Yes        Correct Procedure: Yes        Correct Site: Yes        Correct Position: Yes   Procedure DocumentationProcedure: epidural catheter  Comments:  .Diagnosis- Anticipated vaginal delivery    Continuous Labor Epidural, indication is for labor pain. Following an discussion of the expectations, benefits and risk (including but not limited to nerve damage, infection, bleeding, spinal headache, partial or failed block)--questions were encouraged and answered. The patient appears to understand, and consents to proceed.     The patient received a fluid bolus per orders    Time-out performed.     The patient was in the sitting position, a PVP prep times three was done in the lumbar area followed by placement of a sterile drape. The skin was then infiltrated with lidocaine. A 17ga Touhy needle was then advanced under a loss of resistance technique until the epidural space was identified. The epidural catheter was then advanced and secured. The catheter was then bolused in divided doses with Bupivicaine 0.25% 12 cc, while the patient/fetus was monitored. Infusion orders written and infusion of bupivicaine 0.125% + fentanyl 2mcg/cc 10cc per hour started. PCEA 5cc bolus ever 15 minutes, with a maximum of 20cc per hour.    I or my partner, was immediately available and frequently monitored at necessary intervals.      BENJAMÍN Rao

## 2021-04-16 NOTE — ANESTHESIA PREPROCEDURE EVALUATION
Anesthesia Pre-Procedure Evaluation    Patient: Tiffanie Leyva   MRN: [id number] : 1990        Preoperative Diagnosis: * No pre-op diagnosis entered *   Procedure : * No procedures listed *     History reviewed. No pertinent past medical history.   Past Surgical History:   Procedure Laterality Date     HEAD & NECK SURGERY  2011    wisdom teeth      Allergies   Allergen Reactions     Sulfacetamide      PN: Other reaction(s): *Unknown - Childhood Rxn      Social History     Tobacco Use     Smoking status: Never Smoker     Smokeless tobacco: Never Used   Substance Use Topics     Alcohol use: Not Currently      Wt Readings from Last 1 Encounters:   04/15/21 89.8 kg (198 lb)        Anesthesia Evaluation            ROS/MED HX  ENT/Pulmonary:  - neg pulmonary ROS     Neurologic:  - neg neurologic ROS     Cardiovascular:     (+) hypertension-----    METS/Exercise Tolerance:     Hematologic:  - neg hematologic  ROS     Musculoskeletal:       GI/Hepatic:  - neg GI/hepatic ROS     Renal/Genitourinary:       Endo:       Psychiatric/Substance Use:  - neg psychiatric ROS     Infectious Disease:       Malignancy:       Other:            Physical Exam    Airway        Mallampati: II    Neck ROM: full     Respiratory Devices and Support         Dental           Cardiovascular   cardiovascular exam normal          Pulmonary   pulmonary exam normal            Other findings: .Lab Test        04/15/21                       1631          WBC          9.3           HGB          12.8          MCV          92            PLT          248            Lab Test        04/15/21                       1631          NA           137           POTASSIUM    3.9           CHLORIDE     109           CO2          21            BUN          8             CR           0.63          ANIONGAP     7             JAIRO          8.6           GLC          72              OUTSIDE LABS:  CBC:   Lab Results   Component Value Date    WBC 9.3 04/15/2021     HGB 12.8 04/15/2021    HCT 39.6 04/15/2021     04/15/2021     BMP:   Lab Results   Component Value Date     04/15/2021    POTASSIUM 3.9 04/15/2021    CHLORIDE 109 04/15/2021    CO2 21 04/15/2021    BUN 8 04/15/2021    CR 0.63 04/15/2021    GLC 72 04/15/2021     COAGS: No results found for: PTT, INR, FIBR  POC: No results found for: BGM, HCG, HCGS  HEPATIC:   Lab Results   Component Value Date    ALBUMIN 2.6 (L) 04/15/2021    PROTTOTAL 6.3 (L) 04/15/2021    ALT 17 04/15/2021    AST 13 04/15/2021    ALKPHOS 136 04/15/2021    BILITOTAL 0.3 04/15/2021     OTHER:   Lab Results   Component Value Date    JAIRO 8.6 04/15/2021       Anesthesia Plan    ASA Status:  2      Anesthesia Type: Epidural.              Consents    Anesthesia Plan(s) and associated risks, benefits, and realistic alternatives discussed. Questions answered and patient/representative(s) expressed understanding.     - Discussed with:  Patient         Postoperative Care            Comments:           neg OB CRUZ.       Kian Rao DO

## 2021-04-16 NOTE — PROVIDER NOTIFICATION
04/16/21 0834   Provider Notification   Provider Name/Title Dr. Rodriguez   Method of Notification Electronic Page     Updated MD pt felt as if her water broke. SVE remains the same, anterior lip 9.5//0. Intact bag felt. Pt would like MD to come and rupture membranes. Provider will come to the unit in a few minutes.

## 2021-04-16 NOTE — PROVIDER NOTIFICATION
04/16/21 1230   Provider Notification   Provider Name/Title Dr. Rodriguez   Method of Notification In Department     Updated MD on SVE 9.5 anterior right sided lip//0. Provider will remain in house for delivery.

## 2021-04-16 NOTE — PROGRESS NOTES
Patient doing well.  She opts for Hernandez ripening balloon.    FHT: 120/mod/+accel/-decel  Celeste: q 3 min  SVE: 1.5/50/-1    Hernandez Ripening Balloon insertion   Reviewed risks of procedure with patient. Risks include but not limited to pain, bleeding, infection, rupture of membranes, fetal heart rate decelerations.  She is aware that the balloon may stay in for up to 24 hours. She understands and agrees.  FHT prior to insertion was cat I.      Speculum was placed with good visualization of cervix.  Hernandez catheter was placed under direct visualization.  Balloon was inflated with 60cc of sterile saline.  Appropriate location confirmed. Patient tolerated well.  FHT after insertion was cat I.  If contractions space out, will start low dose pitocin.      Assessment  Tiffanie Leyva is a 30 year old  at 40+6 wga, IOL for preE without SF     Plan  - IOL: s/p uncomplicated Hernandez balloon placement.  Plan to keep in for up to 24 hours. Will not start low dose pit at this time, as contractions are regular  - FHT: cat I. Cont CEFM.  - Pre-E without SF:  Bps nl to mild range.  No s/sx of preE. Monitor Bps closely and treat with IV antihypertensives + mag if sustained severe range Bps  - Anx, Dep: on Lexapro 20mg and Atarax PRN.  Mood stable with no SI/HI. Followed by psych and therapy .  - A pos  - GBS negative  - PP Contraception: NuvaRing  - Anticipate

## 2021-04-16 NOTE — L&D DELIVERY NOTE
Tiffanie Leyva is a 30 year old-year-old  female,  1 para 0 with EDC 4/15/21, who was admitted for GHTN from clinic and diagnosed with pre-eclampsia without SF at 40 weeks 6 days gestation.    Her prenatal care was at the [facility] in [place]. Prenatal course was complicated by anxiety/depression on lexapro and atarax PRN, pre-eclampsia diagnosed on admission. Vaginal Group B Streptococcus culture was negative.  Patient underwent artificial rupture of membranes at 0845, yielding moderate meconium fluid.  Oxytocin augmentation was initiated per standard protocol for absent/inadequate labor.  Patient received an epidural injection for pain relief.  The patient achieved complete dilation at 1438. She went on to deliver a 8 #, 8 oz male infant at 1641 by . Apgars were  8 at one minute and 9 at five minutes. The fetal oropharynx was bulb suctioned on the perineum.  There was no nuchal cord. The placenta delivered spontaneously and intact at 1644.  The patient had a second degree. This was repaired with #3-0 vicryl.  EBL for the delivery was 405.    Dr. Susy Rodriguez DO

## 2021-04-17 LAB — HGB BLD-MCNC: 11.3 G/DL (ref 11.7–15.7)

## 2021-04-17 PROCEDURE — 120N000001 HC R&B MED SURG/OB

## 2021-04-17 PROCEDURE — 999N000081 HC STATISTIC IP LACTATION SERVICES 31-45 MIN

## 2021-04-17 PROCEDURE — 36415 COLL VENOUS BLD VENIPUNCTURE: CPT | Performed by: OBSTETRICS & GYNECOLOGY

## 2021-04-17 PROCEDURE — 250N000013 HC RX MED GY IP 250 OP 250 PS 637: Performed by: OBSTETRICS & GYNECOLOGY

## 2021-04-17 PROCEDURE — 85018 HEMOGLOBIN: CPT | Performed by: OBSTETRICS & GYNECOLOGY

## 2021-04-17 RX ORDER — ACETAMINOPHEN 325 MG/1
650 TABLET ORAL EVERY 6 HOURS PRN
Qty: 30 TABLET | Refills: 0 | Status: SHIPPED | OUTPATIENT
Start: 2021-04-17

## 2021-04-17 RX ORDER — AMOXICILLIN 250 MG
1 CAPSULE ORAL 2 TIMES DAILY PRN
Qty: 60 TABLET | Refills: 0 | Status: SHIPPED | OUTPATIENT
Start: 2021-04-17

## 2021-04-17 RX ORDER — IBUPROFEN 800 MG/1
800 TABLET, FILM COATED ORAL EVERY 6 HOURS PRN
Qty: 30 TABLET | Refills: 0 | Status: SHIPPED | OUTPATIENT
Start: 2021-04-17

## 2021-04-17 RX ADMIN — ESCITALOPRAM OXALATE 20 MG: 20 TABLET ORAL at 20:14

## 2021-04-17 RX ADMIN — DOCUSATE SODIUM 50 MG AND SENNOSIDES 8.6 MG 1 TABLET: 8.6; 5 TABLET, FILM COATED ORAL at 11:58

## 2021-04-17 RX ADMIN — IBUPROFEN 800 MG: 800 TABLET ORAL at 18:26

## 2021-04-17 RX ADMIN — IBUPROFEN 800 MG: 800 TABLET ORAL at 11:59

## 2021-04-17 RX ADMIN — DOCUSATE SODIUM 50 MG AND SENNOSIDES 8.6 MG 2 TABLET: 8.6; 5 TABLET, FILM COATED ORAL at 20:14

## 2021-04-17 NOTE — PLAN OF CARE
Data: Vital signs within normal limits. Postpartum checks within normal limits - see flow record. Patient eating and drinking normally. Patient able to empty bladder independently and is up ambulating. No apparent signs of infection.  2nd degree laceration tender, using Ibuprofen, Tucks, donut pillow and Ice with some relief.    Patient performing self cares and is able to care for infant.  Action: Patient medicated during the shift for perineum pain. See MAR. Patient reassessed within 1 hour after each medication and pain was improved - patient stated she was comfortable. Patient education done about safe infant sleep, basic infant cares, bulb syringe usage, breastfeeding cues, positions, and satiety, pain management, post partum depression and self cares. See flow record.PP and [place] booklet reviewed and all questions answered.   Response: Positive attachment behaviors observed with infant. Support person Amish present.   Plan:Will continue to monitor, assess, and prepare for discharge.  Anticipate discharge on 21.

## 2021-04-17 NOTE — PLAN OF CARE
Patient vital signs stable and meeting expected outcomes.  Breastfeeding fair with some assistance from staff.  Up independently and voiding adequately.  Pain well controlled with ibuprofen.  Able to perform all cares for self and infant.  Bonding well with baby.   present and supportive at bedside.  Will continue to monitor.

## 2021-04-17 NOTE — DISCHARGE SUMMARY
[facility]    Discharge Summary  Obstetrics    Date of Admission:  4/15/2021  Date of Discharge:  21  Discharging Provider: Berenice Owen MD    Discharge Diagnoses   Patient Active Problem List   Diagnosis     Encounter for triage in pregnant patient     Pre-eclampsia, mild       History of Present Illness   Tiffanie Leyva is a 30 year old female now  who [facility] with newly diagnosed pre-Eclampsia without severe features. Her pregnancy has been complicated by FREDY and MDD on Lexapro and Atarax. Please see her admit H&P for full details of her PMH, PSH, Meds, Allergies and exam on admit.    Hospital Course   Tiffanie Leyva is a 30 year old-year-old  female,  1 para 0 with EDC 4/15/21, who was admitted for GHTN from clinic and diagnosed with pre-eclampsia without SF at 40 weeks 6 days gestation.    Her prenatal care was at the [facility] in [place]. Prenatal course was complicated by anxiety/depression on lexapro and atarax PRN, pre-eclampsia diagnosed on admission. Vaginal Group B Streptococcus culture was negative.  Patient underwent artificial rupture of membranes at 0845, yielding moderate meconium fluid.  Oxytocin augmentation was initiated per standard protocol for absent/inadequate labor.  Patient received an epidural injection for pain relief.  The patient achieved complete dilation at 1438. She went on to deliver a 8 #, 8 oz male infant at 1641 by . Apgars were  8 at one minute and 9 at five minutes. The fetal oropharynx was bulb suctioned on the perineum.  There was no nuchal cord. The placenta delivered spontaneously and intact at 1644.  The patient had a second degree. This was repaired with #3-0 vicryl.    Her postpartum course was uncomplicated. On postpartum day 2, she was meeting all of her postpartum goals and deemed stable for discharge. She was voiding without difficulty, tolerating a regular  diet without nausea and vomiting, her pain was well controlled on oral pain medicines and her lochia was appropriate.    Hgb:   Lab Results   Component Value Date    HGB 11.3 2021    HGB 12.8 04/15/2021       Lab Results   Component Value Date    RH Pos 04/15/2021    and rhogam was not indicated    Contraception was discussed and will be addressed at her postpartum appointment.    Instructions:  1) Call for temperature greater than 100.4F, foul smelling vaginal discharge, bleeding more than 1 pad per hour for 2 hrs, pain not controlled by oral pain meds, severe constipation or severe nausea or vomiting.  2) She was instructed to follow-up with her primary OB in 6 weeks for a routine postpartum visit  3) She was instructed to continue her PNV on discharge if she wished to breast feed her infant.  4) Take BP at home BID x1 week and call clinic with results     Charmaine Saunders MD, MD    Discharge Disposition   Discharged to home   Condition at discharge: Satisfactory    Primary Care Physician   Nancy Gutiérrez    Consultations This Hospital Stay   ANESTHESIOLOGY IP CONSULT  HOME CARE POST PARTUM/ IP CONSULT  LACTATION IP CONSULT    Discharge Orders      SPHYG/BP KENNY W CUFF AND STET     Discharge Medications   Current Discharge Medication List      START taking these medications    Details   acetaminophen (TYLENOL) 325 MG tablet Take 2 tablets (650 mg) by mouth every 6 hours as needed for mild pain or fever (greater than or equal to 38  C /100.4  F (oral) or 38.5  C/ 101.4  F (core).)  Qty: 30 tablet, Refills: 0    Associated Diagnoses: Vaginal delivery      ibuprofen (ADVIL/MOTRIN) 800 MG tablet Take 1 tablet (800 mg) by mouth every 6 hours as needed for other (cramping)  Qty: 30 tablet, Refills: 0    Associated Diagnoses: Vaginal delivery      senna-docusate (SENOKOT-S/PERICOLACE) 8.6-50 MG tablet Take 1 tablet by mouth 2 times daily as needed for constipation  Qty: 60 tablet, Refills: 0    Associated  Diagnoses: Vaginal delivery         CONTINUE these medications which have NOT CHANGED    Details   escitalopram (LEXAPRO) 20 MG tablet Take 20 mg by mouth daily      hydrOXYzine (ATARAX) 50 MG tablet Take 50 mg by mouth every 6 hours      Prenatal MV-Min-Fe Fum-FA-DHA (PRENATAL 1 PO)          STOP taking these medications       atovaquone-proguanil (MALARONE) 250-100 MG tablet Comments:   Reason for Stopping:         etonogestrel-ethinyl estradiol (NUVARING) 0.12-0.015 MG/24HR vaginal ring Comments:   Reason for Stopping:         ISOtretinoin (ACCUTANE) 40 MG capsule Comments:   Reason for Stopping:             Allergies   Allergies   Allergen Reactions     Sulfacetamide      PN: Other reaction(s): *Unknown - Childhood Rxn

## 2021-04-17 NOTE — PLAN OF CARE
Data: Vital signs within normal limits. Postpartum checks within normal limits - see flow record. Patient eating and drinking normally. Patient able to empty bladder independently and is up ambulating. No apparent signs of infection, healing well. Patient performing self cares and is able to care for infant. Breastfeeding well with a LATCH score of 8.  Baby does have more of a shallow latch, but will get on okay when pt sandwiches nipple into baby's mouth.    Action: Taking ibuprofen for pain/comfort with some relief.  See MAR.   Response: Positive attachment behaviors observed with infant. FOB present, supportive and helpful with cares.   Plan: Anticipate discharge on 4/18/2021.

## 2021-04-17 NOTE — ANESTHESIA POSTPROCEDURE EVALUATION
Patient: Tiffanie Leyva    * No procedures listed *    Diagnosis:* No pre-op diagnosis entered *  Diagnosis Additional Information: No value filed.    Anesthesia Type:  Epidural    Note:     Postop Pain Control:    PONV:    Neuro/Psych:    Airway/Respiratory:    CV/Hemodynamics:    Other NRE:    DID A NON-ROUTINE EVENT OCCUR?     Event details/Postop Comments:      S/P epidural for labor.   I or my partner was immediately available for management of this patient during epidural analgesia infusion.  VSS.  Doing well. Block resolved.  Neuro at baseline. Denies positional headache. Minimal side effects easily managed w/ PRN meds. No apparent anesthetic complications. No follow-up required.    Shekhar Fitzpatrick MD           Last vitals:  Vitals:    04/16/21 2310 04/17/21 0300 04/17/21 1019   BP: 136/85 134/87 128/70   Pulse: 74 86 77   Resp: 16 16 14   Temp: 98.2  F (36.8  C)  97.8  F (36.6  C)   SpO2:          Last vitals prior to Anesthesia Care Transfer:      Electronically Signed By: Shekhar Fitzpatrick MD  April 17, 2021  3:00 PM

## 2021-04-17 NOTE — PROGRESS NOTES
[facility]  PPD# 1    Pt doing well. Denies any CP, SOB, LH, [place], HA, RUQ or epigastric pain. Ambulating, voiding, tolerating PO. Decreased lochia. Pain controlled. Breast feeding and coping well with infant.    Vitals:    21 1800 21 1815 21 2310 21 0300   BP: 121/63 130/64 136/85 134/87   Pulse:   74 86   Resp:   16 16   Temp:   98.2  F (36.8  C)    TempSrc:   Oral    SpO2:       Weight:       Height:         Abd soft, appropriately tender, nondistended, FFBU, no fundal tenderness  Ext 1+ edema bilat LE, no CT BL    Lab Results   Component Value Date    HGB 11.3 2021       A/P 30 year old  at 41w0d s/p  PPD# 1. GHTN from clinic and diagnosed with pre-eclampsia without SF at 40 weeks 6 days gestation.     -Hemodynamically stable   -Rh pos  -Diet; regular  -pain Tylenol and Motrin  -Bowel ppx- Senna/docusate/simethicone  -Rubella immune  -Tdap given prenatally  -Breast feeding  -BC: ?    Pre-E w/o severe features   - BP have been WNL   - no signs or symptoms for worsening pre-E   - BP cuff at home, will check BP BID and have BP reading report in 1 week    Depression   - continue on Lexapro and Atarax PRN    -Plan; anticipate discharge home tomorrow if BP continue to be reassuring. Continue PP care.      Dr. Patsy Saunders  [phone]  2021 8:45 AM

## 2021-04-18 ENCOUNTER — MEDICAL CORRESPONDENCE (OUTPATIENT)
Dept: HEALTH INFORMATION MANAGEMENT | Facility: CLINIC | Age: 31
End: 2021-04-18

## 2021-04-18 VITALS
RESPIRATION RATE: 16 BRPM | SYSTOLIC BLOOD PRESSURE: 132 MMHG | HEART RATE: 83 BPM | TEMPERATURE: 97.7 F | WEIGHT: 198 LBS | HEIGHT: 69 IN | BODY MASS INDEX: 29.33 KG/M2 | OXYGEN SATURATION: 98 % | DIASTOLIC BLOOD PRESSURE: 84 MMHG

## 2021-04-18 PROCEDURE — 250N000013 HC RX MED GY IP 250 OP 250 PS 637: Performed by: OBSTETRICS & GYNECOLOGY

## 2021-04-18 RX ADMIN — IBUPROFEN 800 MG: 800 TABLET ORAL at 06:43

## 2021-04-18 RX ADMIN — IBUPROFEN 800 MG: 800 TABLET ORAL at 00:35

## 2021-04-18 RX ADMIN — DOCUSATE SODIUM 50 MG AND SENNOSIDES 8.6 MG 1 TABLET: 8.6; 5 TABLET, FILM COATED ORAL at 09:33

## 2021-04-18 NOTE — PLAN OF CARE
Pt meeting expected outcomes. Vitals stable. Fundus firm and midline. Denies difficulty voiding. Pain controlled with ibuprofen. Independent with self and baby cares. Breastfeeding , much improved from yesterday, LC saw, pt is able to latch baby independently. Plan to discharge home today.

## 2021-04-18 NOTE — PLAN OF CARE
Data: Vital signs within normal limits. Postpartum checks within normal limits, except for slightly elevated blood pressures (131/90), see flow record. Patient eating and drinking normally. Patient able to empty bladder independently and is up ambulating. No apparent signs of infection.  2nd degree laceration tender but improving with, ice, Tucks, Ibuprofen, and donut pillow to relieve pressure.    Patient performing self cares and is able to care for infant.  Action: Patient stated she was comfortable, denies any pre-eclamptic symptoms. Had stool softener, reports increased gas but no BM  since birth. Patient education done about safe infant sleep, basic  cares, breastfeeding cues, positions, and satiety, pain management and self cares. PP and  booklet and discharge instructions reviewed and all questions answered. See flow record.Patient sent home with blood pressure monitor and instructed on usage and clinic follow up.   Response: Positive attachment behaviors observed with infant. Support person [place] present and attentive to patient and infant.   Plan: Plan is to discharge home today with blood pressure cuff for monitoring and to:   1) Call for temperature greater than 100.4F, foul smelling vaginal discharge, bleeding more than 1 pad per hour for 2 hrs, pain not controlled by oral pain meds, severe constipation or severe nausea or vomiting.  2) She was instructed to follow-up with her primary OB in 6 weeks for a routine postpartum visit  3) She was instructed to continue her PNV on discharge if she wished to breast feed her infant.  4) Take BP at home BID x1 week and call clinic with results   Anticipate discharge on 21.    Patient was discharged via wheelchair with infant in her lap by RN at 1400

## 2021-04-18 NOTE — PROGRESS NOTES
[facility]   Post-Partum Note    Name:  Tiffanie Leyva  MRN: [id number]    S: Patient doing well this AM.  Pain well controlled with ibuprofen and tylenol.  Tolerating regular diet without nausea or vomiting.  Ambulating without dizziness.  Lochia less than a typical period.  Breastfeeding with some pinching when shallow latch occurs. No BM yet.  Plans discharge today.    O:   Patient Vitals for the past 24 hrs:   BP Temp Temp src Pulse Resp   21 0034 125/84 97.9  F (36.6  C) Oral 79 15   21 1747 124/78 97.8  F (36.6  C) Oral 59 16   21 1019 128/70 97.8  F (36.6  C) Oral 77 14     Gen:  Resting comfortably, NAD  CV:  Regular rate  Pulm:  Breathing comfortably on room air   Abd:  Soft, non-distended. Fundus at 2cm below umbilicus, firm and non-tender.  Ext:  non-tender    Hgb:   Recent Labs   Lab Test 21  0719   HGB 11.3*     Assessment/Plan:  30 year old  on PPD #2 s/p . Pregnancy notable for pre-eclampsia without severe features.   - continue with routine postpartum management  Pain: Well-controlled with ibuprofen and tylenol  Hgb: 12.8>> 11.3  Rh: pos  Rubella:  immune  Feed: breast  BC: Still deciding   Dispo: Plan for home today    Pre-E w/o SF  - BP all WNL without medications  - no signs/symptoms of severe features   - has a BP cuff ordered, will check BID and call with BP >150/100     Depression  - continue on Lexapro and Atarax PRN     Discussed discharge medications, add Miralax (has at home) if no BM by tomorrow     Leah Henke, MD [facility]  21

## 2021-04-18 NOTE — LACTATION NOTE
This note was copied from a baby's chart.  Lactation visit. Tiffanie was attempting a latch in the cradle hold position. [place] was tummy up and bobbing with head in attempts to latch on. Placed  tummy to tummy and used hand over hand positioning to demonstrate cross cradle hold. Exaggerated latch technique discussed and demonstrated. [place] then latched deeply onto breast and mother reported only a minor pinching feeling that went away after initial sucking efforts. Audible swallows noted and pointed out to parents.  exhibiting a frequent start/stop pattern, so encouraged methods to keep  stimulated during feeds. Mother demonstrated back breast compression. Also discussed proper  alignment during feeds, ideal pump flange sizing, and use of nipple cream/hydrogels for some existing blistering/bruised areas. Encouraged feeding  frequently on cues and not adhering to a rigid feeding schedule at this time. No additional questions or concerns at this time. Latch score of 6 assigned.

## 2021-04-18 NOTE — DISCHARGE INSTRUCTIONS
Instructions:  1) Call for temperature greater than 100.4F, foul smelling vaginal discharge, bleeding more than 1 pad per hour for 2 hrs, pain not controlled by oral pain meds, severe constipation or severe nausea or vomiting.  2) Follow-up with her primary OB in 6 weeks for a routine postpartum visit  3) She was instructed to continue her PNV on discharge if she wished to breast feed her infant.  4) Take BP at home BID x1 week and call clinic with results, if over 150/100.     Preeclampsia   Call your doctor right away if you have any of the following:  - Edema (swelling) in your face or hands  - Rapid weight gain-about 1 pound or more in a day  - Headache  - Abdominal pain on your right side  - Vision problems (flashes or spots)  - You have questions or concerns once you return home.    Postpartum Vaginal Delivery Instructions    Activity       Ask family and friends for help when you need it.    Do not place anything in your vagina for 6 weeks.    You are not restricted on other activities, but take it easy for a few weeks to allow your body to recover from delivery.  You are able to do any activities you feel up to that point.    No driving until you have stopped taking your pain medications (usually two weeks after delivery).     Call your health care provider if you have any of these symptoms:       Increased pain, swelling, redness, or fluid around your stiches from an episiotomy or perineal tear.    A fever above 100.4 F (38 C) with or without chills when placing a thermometer under your tongue.    You soak a sanitary pad with blood within 1 hour, or you see blood clots larger than a golf ball.    Bleeding that lasts more than 6 weeks.    Vaginal discharge that smells bad.    Severe pain, cramping or tenderness in your lower belly area.    A need to urinate more frequently (use the toilet more often), more urgently (use the toilet very quickly), or it burns when you urinate.    Nausea and  vomiting.    Redness, swelling or pain around a vein in your leg.    Problems breastfeeding or a red or painful area on your breast.    Chest pain and cough or are gasping for air.    Problems coping with sadness, anxiety, or depression.  If you have any concerns about hurting yourself or the baby, call your provider immediately.     You have questions or concerns after you return home.     Keep your hands clean:  Always wash your hands before touching your perineal area and stitches.  This helps reduce your risk of infection.  If your hands aren't dirty, you may use an alcohol hand-rub to clean your hands. Keep your nails clean and short.

## 2021-04-20 LAB — COPATH REPORT: NORMAL

## 2021-04-21 ENCOUNTER — HOSPITAL ENCOUNTER (EMERGENCY)
Facility: CLINIC | Age: 31
End: 2021-04-21
Payer: COMMERCIAL

## 2021-04-22 ENCOUNTER — HOSPITAL ENCOUNTER (OUTPATIENT)
Facility: CLINIC | Age: 31
Discharge: HOME | End: 2021-04-22
Attending: OBSTETRICS & GYNECOLOGY | Admitting: OBSTETRICS & GYNECOLOGY
Payer: COMMERCIAL

## 2021-04-22 ENCOUNTER — HOSPITAL ENCOUNTER (OUTPATIENT)
Facility: CLINIC | Age: 31
End: 2021-04-22
Admitting: OBSTETRICS & GYNECOLOGY
Payer: COMMERCIAL

## 2021-04-22 VITALS — DIASTOLIC BLOOD PRESSURE: 89 MMHG | TEMPERATURE: 98.3 F | SYSTOLIC BLOOD PRESSURE: 137 MMHG | RESPIRATION RATE: 18 BRPM

## 2021-04-22 PROCEDURE — G0463 HOSPITAL OUTPT CLINIC VISIT: HCPCS

## 2021-04-22 NOTE — PROVIDER NOTIFICATION
21 0113   Provider Notification   Provider Name/Title Dr. Rodriguez   Method of Notification Phone   Request Evaluate-Remote     MD notified of patient arrival.  who delivered vaginally at 41 weeks, who came in for elevated blood pressures on her home BP cuff. She reported having pressures in the 150's over 90's at home, a mild headache, and one occurrence of spotty vision that lasted a few seconds. Trace amounts of edema noted, reflexes and clonus normal. BP's since arrival have been in the 120's to 130's over 80's. Pre-e labs from delivery were WDL (see results). MD would like the patient to schedule a clinic appt today or tomorrow to get her BP's checked and to bring her home cuff and machine with her. Verbal orders to discharge patient home.

## 2021-04-22 NOTE — DISCHARGE INSTRUCTIONS
Discharge Instruction for Undelivered Patients      You were seen for: blood pressure evaluation  We Consulted: Dr. Rodriguez  You had (Test or Medicine): blood pressure checks       Call your provider if you notice:  Swelling in your face or increased swelling in your hands or legs.  Headaches that are not relieved by Tylenol (acetaminophen).  Changes in your vision (blurring: seeing spots or stars.)  Nausea (sick to your stomach) and vomiting (throwing up).   Weight gain of 5 pounds or more per week.  Elevated blood pressures    Follow-up:  Make an appointment to be seen on 4/22 (Thursday) or 4/23 (Friday)

## 2021-04-22 NOTE — PLAN OF CARE
Data: Patient assessed in the [facility] for elevated blood pressures.   Action: Discharge instructions reviewed. Pre-eclampsia signs and symptoms reviewed with patient and spouse.   Response: Orders to discharge home per Susy Rodriguez.  Patient verbalized understanding of education and verbalized agreement with plan. Discharged to home at 0145.

## 2021-04-22 NOTE — PLAN OF CARE
Data: Patient presented to [facility]: 2021 12:13 AM.  Reason for maternal/fetal assessment is elevated blood pressures. Patient reports having a BP in the 150's/90's and a mild headache.  Patient is a .  Prenatal record reviewed.Gestational Age 41w0d. VSS. Patient denies epigastric or URQ pain, significant edema. Support person is present.   Action: Triage assessment completed. Bill of rights reviewed.  Response: Patient verbalized agreement with plan. Will contact Dr Susy Rodriguez with update and further orders.